# Patient Record
Sex: FEMALE
[De-identification: names, ages, dates, MRNs, and addresses within clinical notes are randomized per-mention and may not be internally consistent; named-entity substitution may affect disease eponyms.]

---

## 2024-04-02 ENCOUNTER — APPOINTMENT (OUTPATIENT)
Dept: ORTHOPEDIC SURGERY | Facility: CLINIC | Age: 34
End: 2024-04-02
Payer: MEDICAID

## 2024-04-02 DIAGNOSIS — M25.561 PAIN IN RIGHT KNEE: ICD-10-CM

## 2024-04-02 PROBLEM — Z00.00 ENCOUNTER FOR PREVENTIVE HEALTH EXAMINATION: Status: ACTIVE | Noted: 2024-04-02

## 2024-04-02 PROCEDURE — 99203 OFFICE O/P NEW LOW 30 MIN: CPT

## 2024-04-02 NOTE — HISTORY OF PRESENT ILLNESS
[de-identified] : 33-year-old female presents for right knee pain.  This pain started atraumatically and has been going on for months.  Patient went to her primary care physician, patient was prescribed meloxicam 15 mg.  Patient states the pain has been getting worse with activity so she needs to call out of work.  Patient denies any past problems with the knee.  Patient also noticed her whole leg gets swollen and the skin changes.

## 2024-04-02 NOTE — PHYSICAL EXAM
[de-identified] : Physical exam of right knee: -No erythema, edema, ecchymosis present.  Skin intact -TTP over quadriceps tendon and vastus medialis.  No tenderness over the knee joint. -Calf is soft and nontender -Negative Hailey's, negative anterior drawer, patient able to straight leg raise -Full ROM of knee, pain over quadriceps with knee extension -+2 posterior tibialis pulse -Sensation intact to light touch

## 2024-04-02 NOTE — DISCUSSION/SUMMARY
[de-identified] : Patient has right knee pain.  At this time I recommend physical therapy to work on strengthening, stretching, range of motion especially of the right quadriceps.  Patient was advised to also apply heat to the area and continue taking the meloxicam that was prescribed as needed.  I advised the patient also wear compression sleeve during work.  I also like patient to follow-up further with either her primary care, vascular surgeon, dermatologist for further assessment of her skin changes and swelling.  Patient will follow-up in approximately 2 months for further assessment if needed.  Patient agrees above plan all questions were answered today

## 2024-04-02 NOTE — DATA REVIEWED
[FreeTextEntry1] : X-ray images were obtained at the office today.  Images of the right knee, AP, lateral, oblique views reveal no acute fractures, dislocations, bony abnormalities

## 2024-06-06 ENCOUNTER — APPOINTMENT (OUTPATIENT)
Dept: ORTHOPEDIC SURGERY | Facility: CLINIC | Age: 34
End: 2024-06-06